# Patient Record
Sex: FEMALE | Race: WHITE | URBAN - METROPOLITAN AREA
[De-identification: names, ages, dates, MRNs, and addresses within clinical notes are randomized per-mention and may not be internally consistent; named-entity substitution may affect disease eponyms.]

---

## 2017-07-17 DIAGNOSIS — E28.2 PCOS (POLYCYSTIC OVARIAN SYNDROME): ICD-10-CM

## 2017-07-17 RX ORDER — METFORMIN HCL 500 MG
500 TABLET, EXTENDED RELEASE 24 HR ORAL
Qty: 30 TABLET | Refills: 0 | Status: SHIPPED | OUTPATIENT
Start: 2017-07-17 | End: 2017-08-16

## 2017-07-17 RX ORDER — SPIRONOLACTONE 100 MG/1
100 TABLET, FILM COATED ORAL 2 TIMES DAILY
Qty: 60 TABLET | Refills: 0 | Status: SHIPPED | OUTPATIENT
Start: 2017-07-17 | End: 2017-08-16

## 2017-07-17 NOTE — TELEPHONE ENCOUNTER
Received refill request for spirolactone and metformin for PCOS. Patient due for check up with Orlin. Spoke with patient to let her know a temporary refill would be sent but that she needs to see Orlin. Patient agrees and was transferred to scheduling.

## 2017-08-16 ENCOUNTER — TELEPHONE (OUTPATIENT)
Dept: OBGYN | Facility: CLINIC | Age: 22
End: 2017-08-16

## 2017-08-16 NOTE — TELEPHONE ENCOUNTER
----- Message from Lisette Ordaz MD sent at 8/16/2017  8:23 AM CDT -----  Regarding: she missed her appt today but i just renewed all meds for the year for her PCOS.  cricket prn

## 2017-08-16 NOTE — TELEPHONE ENCOUNTER
Pt missed appt today-Dr. Ordaz filled all of patients PCOS medications for the year. She should follow up as needed. Left VM for pt that prescriptions were sent.

## 2017-11-26 ENCOUNTER — HEALTH MAINTENANCE LETTER (OUTPATIENT)
Age: 22
End: 2017-11-26

## 2018-02-02 ENCOUNTER — OFFICE VISIT (OUTPATIENT)
Dept: OBGYN | Facility: CLINIC | Age: 23
End: 2018-02-02
Payer: COMMERCIAL

## 2018-02-02 VITALS
HEIGHT: 64 IN | SYSTOLIC BLOOD PRESSURE: 103 MMHG | DIASTOLIC BLOOD PRESSURE: 71 MMHG | HEART RATE: 74 BPM | WEIGHT: 141 LBS | BODY MASS INDEX: 24.07 KG/M2

## 2018-02-02 DIAGNOSIS — N93.9 VAGINAL BLEEDING: ICD-10-CM

## 2018-02-02 DIAGNOSIS — Z12.4 CERVICAL CANCER SCREENING: Primary | ICD-10-CM

## 2018-02-02 DIAGNOSIS — N90.89 VULVAR IRRITATION: ICD-10-CM

## 2018-02-02 DIAGNOSIS — Z11.3 ROUTINE SCREENING FOR STI (SEXUALLY TRANSMITTED INFECTION): ICD-10-CM

## 2018-02-02 DIAGNOSIS — N89.8 VAGINAL DISCHARGE: ICD-10-CM

## 2018-02-02 LAB
CLUE CELLS: NORMAL
TRICHOMONAS (WET PREP): NORMAL
YEAST (WET PREP): NORMAL

## 2018-02-02 PROCEDURE — G0145 SCR C/V CYTO,THINLAYER,RESCR: HCPCS | Performed by: STUDENT IN AN ORGANIZED HEALTH CARE EDUCATION/TRAINING PROGRAM

## 2018-02-02 PROCEDURE — 87491 CHLMYD TRACH DNA AMP PROBE: CPT | Performed by: STUDENT IN AN ORGANIZED HEALTH CARE EDUCATION/TRAINING PROGRAM

## 2018-02-02 PROCEDURE — 87210 SMEAR WET MOUNT SALINE/INK: CPT | Mod: ZF | Performed by: STUDENT IN AN ORGANIZED HEALTH CARE EDUCATION/TRAINING PROGRAM

## 2018-02-02 PROCEDURE — 87591 N.GONORRHOEAE DNA AMP PROB: CPT | Performed by: STUDENT IN AN ORGANIZED HEALTH CARE EDUCATION/TRAINING PROGRAM

## 2018-02-02 PROCEDURE — G0463 HOSPITAL OUTPT CLINIC VISIT: HCPCS | Mod: ZF

## 2018-02-02 RX ORDER — ESTRADIOL 0.5 MG/1
0.5 TABLET ORAL DAILY PRN
Qty: 30 TABLET | Refills: 3 | Status: SHIPPED | OUTPATIENT
Start: 2018-02-02 | End: 2018-06-01

## 2018-02-02 NOTE — PATIENT INSTRUCTIONS
Nice to meet you today!  Please keep your appointment with Dr. Ordaz in March.  You can take the oral estrogen daily when you have breakthrough spotting.  Use the vaginal estrogen cream daily for the 1st week, then twice per week for the second week, then weekly after. Use a pea size amount and apply to the vulva near the vaginal opening.   Please follow up in clinic as needed.

## 2018-02-02 NOTE — MR AVS SNAPSHOT
After Visit Summary   2/2/2018    Brenda Morrissey    MRN: [id number]           Patient Information     Date Of Birth          1995        Visit Information        Provider Department      2/2/2018 2:15 PM Tanesha Yarbrough MD [facility]        Today's Diagnoses     Cervical cancer screening    -  1    Routine screening for STI (sexually transmitted infection)        Vaginal discharge        Vaginal bleeding        Vulvar irritation          Care Instructions    Nice to meet you today!  Please keep your appointment with Dr. Ordaz in March.  You can take the oral estrogen daily when you have breakthrough spotting.  Use the vaginal estrogen cream daily for the 1st week, then twice per week for the second week, then weekly after. Use a pea size amount and apply to the vulva near the vaginal opening.   Please follow up in clinic as needed.           Follow-ups after your visit        Your next 10 appointments already scheduled     Mar 28, 2018 10:00 AM CDT   Annual Visit with Lisette Ordaz MD   [facility] ([facility])    [facility] [address]  [address]  [facility] [address] [phone]              Who to contact     Please call your clinic at [phone] to:    Ask questions about your health    Make or cancel appointments    Discuss your medicines    Learn about your test results    Speak to your doctor   If you have compliments or concerns about an experience at your clinic, or if you wish to file a complaint, please contact [facility] Patient Relations at [phone] or email us at [email or web address]         Additional Information About Your Visit        MyChart Information     [employer] gives you secure access to your electronic health record. If you see a primary care provider, you can also send messages to your care team and make appointments. If you  "have questions, please call your primary care clinic.  If you do not have a primary care provider, please call [phone] and they will assist you.      [employer] is an electronic gateway that provides easy, online access to your medical records. With [employer], you can request a clinic appointment, read your test results, renew a prescription or communicate with your care team.     To access your existing account, please contact your [facility] or call [phone] for assistance.        Care EveryWhere ID     This is your Care EveryWhere ID. This could be used by other organizations to access your [facility] medical records  [id number]        Your Vitals Were     Pulse Height Last Period Breastfeeding? BMI (Body Mass Index)       74 1.626 m (5' 4\") 01/14/2017 No 24.2 kg/m2        Blood Pressure from Last 3 Encounters:   02/02/18 103/71   01/11/16 103/66   07/31/15 105/69    Weight from Last 3 Encounters:   02/02/18 64 kg (141 lb)   02/12/16 55.9 kg (123 lb 4.8 oz) (41 %)*   01/11/16 57.6 kg (127 lb) (49 %)*     * Growth percentiles are based on CDC 2-20 Years data.              We Performed the Following     Chlamydia trachomatis PCR (Lab)     Neisseria gonorrhoeae PCR     Obtaining, preparing and conveyance of cervical or vaginal smear to laboratory.     Pap imaged thin layer screen only - recommended age 21 - 24 years     Wet Prep POCT          Today's Medication Changes          These changes are accurate as of 2/2/18  3:36 PM.  If you have any questions, ask your nurse or doctor.               Start taking these medicines.        Dose/Directions    conjugated estrogens cream   Commonly known as:  PREMARIN   Used for:  Vulvar irritation   Started by:  Tanesha Yarbrough MD        Dose:  0.5 g   Place 0.5 g vaginally daily   Quantity:  5 g   Refills:  1       estradiol 0.5 MG tablet   Commonly known as:  ESTRACE   Used for:  Vaginal bleeding   Started by:  Tanesha Yarbrough " MD Nilda        Dose:  0.5 mg   Take 1 tablet (0.5 mg) by mouth daily as needed   Quantity:  30 tablet   Refills:  3            Where to get your medicines      These medications were sent to [id number] - [place], [place] - [address]  [address] [address]     Phone:  [phone]     conjugated estrogens cream    estradiol 0.5 MG tablet                Primary Care Provider Office Phone # Fax #    Elma You Montes MD [facility] [phone]       [address] [place] [address]        Equal Access to Services     [facility]: Hadii son ku hadasho Soomaali, waaxda luqadaha, qaybta kaalmada adekendrickyaramona, bob adames . So [facility] [phone].    ATENCIÓN: Si habla español, tiene a villalta disposición servicios gratuitos de asistencia lingüística. [facility] [phone].    We comply with applicable federal civil rights laws and [place] laws. We do not discriminate on the basis of race, color, national origin, age, disability, sex, sexual orientation, or gender identity.            Thank you!     Thank you for choosing [facility]  for your care. Our goal is always to provide you with excellent care. Hearing back from our patients is one way we can continue to improve our services. Please take a few minutes to complete the written survey that you may receive in the mail after your visit with us. Thank you!             Your Updated Medication List - Protect others around you: Learn how to safely use, store and throw away your medicines at www.disposemymeds.org.          This list is accurate as of 2/2/18  3:36 PM.  Always use your most recent med list.                   Brand Name Dispense Instructions for use Diagnosis    clindamycin-benzoyl peroxide gel    BENZACLIN    30 g    Apply topically as needed Apply a thin layer on the face every morning    Acne       conjugated estrogens cream    PREMARIN    5 g     Place 0.5 g vaginally daily    Vulvar irritation       estradiol 0.5 MG tablet    ESTRACE    30 tablet    Take 1 tablet (0.5 mg) by mouth daily as needed    Vaginal bleeding       etonogestrel-ethinyl estradiol 0.12-0.015 MG/24HR vaginal ring    NUVARING    3 each    Place 1 ring every 28 days. Continuous use. No days off.    PCOS (polycystic ovarian syndrome)       metFORMIN 500 MG 24 hr tablet    GLUCOPHAGE-XR    90 tablet    Take 1 tablet (500 mg) by mouth daily (with dinner)    PCOS (polycystic ovarian syndrome)       MULTIVITAMINS PO      Take  by mouth daily.        spironolactone 100 MG tablet    ALDACTONE    90 tablet    Take 1 tablet (100 mg) by mouth 2 times daily    PCOS (polycystic ovarian syndrome)       TAZORAC EX           tretinoin microspheres 0.1 % topical gel    RETIN-A MICRO     Apply  topically At Bedtime. For acne, thinly apply at bedtime. Skip if irritated        WELLBUTRIN PO      Take 50 mg by mouth daily

## 2018-02-02 NOTE — PROGRESS NOTES
"[facility]  Follow-Up Visit    S: Ms. Brenda Morrissey is a 22 year old G0 who presents for to discuss intermenstrual spotting. Her medical history is pertinent for PCOS diagnosed in 2013 and she has been using nuvaring for contraception for the last 6 years. When she started using nuvaring she would use 3 rings continuously and then remove it to have a period. She would occasionally remove nuvaring monthly and have monthly periods. She did this because she \"felt like her body needed a break\". She started doing monthly cycles about 6 months ago. Her periods are usually very light and similar to spotting. She started having intermenstrual spotting about 6 months ago. The spotting will start about 1.5-2 weeks after she placed a new nuvaring and continue through when she would have her period. She bleeding would stop and then she would put a new nuvaring in.     Today she also reports vulvar symptoms of irrigation and thin white discharge. Denies itching or bad odor. She thinks it may be BV.     O:  /71  Pulse 74  Ht 1.626 m (5' 4\")  Wt 64 kg (141 lb)  LMP 01/14/2017  Breastfeeding? No  BMI 24.2 kg/m2  Gen: Well-appearing, NAD  HEENT: Normocephalic, atraumatic  CV:  Well perfused  Pulm: No increased work of breathing  Abd: Soft, non-tender, non-distended  Ext: No LE edema, extremities warm and well perfused    Pelvic:  Introitus and skin around it looks thin, slightly erythematous and atrophic. Discomfort when posterior digital pressure applied just inside introitus.  Otherwise normal appearing external female genitalia. Normal hair distribution that is shaved off. Vagina is without lesions. Scant white physiologic looking discharge. Cervix nuliiparous, ectropion seen, no lesions, no cervical motion tenderness. Uterus is small, mobile, non-tender. No adnexal tenderness or masses      Labs:  POC Wet prep: negative    A/P:  Ms. Brenda Morrissey is a 22 year old G0 here for vaginal spotting.    Vaginal " spotting:  - Intermenstrual spotting is most likely due to atrophy of the endometrial lining due to long term use of nuvaring. Suggested that we could switch her to OCPs and see if that helped but she declined because she had a bad experience with OCPs when she was younger. Discussed with patient that we can try adding back a little estrogen when she is having breakthrough bleeding. Prescribed 0.5 mg estradiol to be taken daily prn when having breakthrough bleeding.   - GC/Chlam collected to rule out cervicitis   - Also ordered pelvic US to rule out more sinister cause of vaginal spotting.     Vulvar irritation and atrophy:  - Most likely due to hypoestrogenic state. Patient was prescribed estrogen cream and instructed to apply daily for one weekly, then twice weekly for one week, then weekly after.   - Wet prep was negative.   - She has follow up with Dr. Ordaz in March, and will reassess at that appointment     Health maintenance:  - Pap smear collected today  - we will send her a Anonymous You message with the results     Patient has an annual exam with Dr. Ordaz on March 28 2018 and she will follow up in clinic as needed.     Patient seen and discussed with Dr. Ordaz.     Tanesha Yarbrough MD PhD  Ob/Gyn, PGY-2  2/2/2018, 2:28 PM    The Patient was seen in [facility] by TANESHA YARBROUGH.  I reviewed the history & exam. I have examined the patient with the resident.   Assessment and plan were jointly made.    Lisette Ordaz MD

## 2018-02-02 NOTE — NURSING NOTE
Chief Complaint   Patient presents with     RECHECK     Birth control consult   Evelyn Antonio LPN

## 2018-02-04 LAB
C TRACH DNA SPEC QL NAA+PROBE: NEGATIVE
N GONORRHOEA DNA SPEC QL NAA+PROBE: NEGATIVE
SPECIMEN SOURCE: NORMAL
SPECIMEN SOURCE: NORMAL

## 2018-02-06 ENCOUNTER — OFFICE VISIT (OUTPATIENT)
Dept: OBGYN | Facility: CLINIC | Age: 23
End: 2018-02-06
Attending: OBSTETRICS & GYNECOLOGY
Payer: COMMERCIAL

## 2018-02-06 DIAGNOSIS — N93.9 VAGINAL BLEEDING: ICD-10-CM

## 2018-02-06 LAB
COPATH REPORT: NORMAL
PAP: NORMAL

## 2018-02-06 PROCEDURE — 76830 TRANSVAGINAL US NON-OB: CPT | Mod: ZF

## 2018-02-06 NOTE — PROGRESS NOTES
22 year old female with LMP mid January, presents for gynecologic ultrasound indicated by intermenstrual bleeding.  This study was done transvaginally.    Uterine findings:   Presence: Visible Size: Normal 3.9x3.9x2.8 cm.  Endometrium = 6.6 mm.   Cx length = 34.2 mm.      Flexion:  Anteverted Position: Midline Margins: Smooth Shape: Normal   Contour: Regular Texture: Homogeneous Cavity: fluid in cavity Masses: Normal    Pelvic findings:    Right Adnexa: Normal   Left Adnexa: Normal   Bladder:  Normal         Cul - de - sac fluid: None    Ovarian follicles:   Right ovary:  2.6x1.5x1.2cm.     Small follicles     Size(s):  Less than 5mm     Left ovary:  2.9x1.4x1.3cm.     Small follicles     Size(s):  Less than 5mm      Comments:  Fluid in endometrial cavity, possibly blood. No intracavitary lesions, otherwise normal ultrasound.     SULY Mar MD

## 2018-02-07 ENCOUNTER — TELEPHONE (OUTPATIENT)
Dept: OBGYN | Facility: CLINIC | Age: 23
End: 2018-02-07

## 2018-02-07 DIAGNOSIS — Z00.00 ROUTINE GENERAL MEDICAL EXAMINATION AT A HEALTH CARE FACILITY: Primary | ICD-10-CM

## 2018-02-07 NOTE — TELEPHONE ENCOUNTER
----- Message from Lisette Ordaz MD sent at 2/7/2018  9:36 AM CST -----  Regarding: RE: Pt requesting labs  Ok to order estradiol level, fasting lipid profile, hgb A1C and fasting glu and insulin.    Thanks.   ----- Message -----     From: Aida Chávez RN     Sent: 2/6/2018  10:24 AM       To: Lisette Ordaz MD  Subject: Pt requesting labs                               Pt was in clinic last week for evaluation abnormal bleeding and vaginal atrophy, presented to clinic today for U/S. Inquired if could get labs checked (hormone labs in particular) prior to annual appt with you in March. I explained why labs are generally ordered at time of annual appt but she is concerned about current treatment of estradiol and estrogen cream w/o lab levels being checked. She's willing to come in for another clinic visit prior to annual if necessary to have these ordered.    Can you advise and route to triage? Thanks!    Maria R

## 2018-04-09 DIAGNOSIS — Z00.00 ROUTINE GENERAL MEDICAL EXAMINATION AT A HEALTH CARE FACILITY: ICD-10-CM

## 2018-04-09 LAB
CHOLEST SERPL-MCNC: 126 MG/DL
ESTRADIOL SERPL-MCNC: 23 PG/ML
GLUCOSE SERPL-MCNC: 76 MG/DL (ref 70–99)
HBA1C MFR BLD: 4.7 % (ref 0–6.4)
HDLC SERPL-MCNC: 89 MG/DL
INSULIN SERPL-ACNC: 6.5 MU/L (ref 3–25)
LDLC SERPL CALC-MCNC: 30 MG/DL
NONHDLC SERPL-MCNC: 37 MG/DL
TRIGL SERPL-MCNC: 37 MG/DL

## 2018-04-09 PROCEDURE — 82947 ASSAY GLUCOSE BLOOD QUANT: CPT | Performed by: INTERNAL MEDICINE

## 2018-04-09 PROCEDURE — 80061 LIPID PANEL: CPT | Performed by: INTERNAL MEDICINE

## 2018-04-09 PROCEDURE — 82670 ASSAY OF TOTAL ESTRADIOL: CPT | Performed by: INTERNAL MEDICINE

## 2018-04-09 PROCEDURE — 83036 HEMOGLOBIN GLYCOSYLATED A1C: CPT | Performed by: INTERNAL MEDICINE

## 2018-04-09 PROCEDURE — 83525 ASSAY OF INSULIN: CPT | Performed by: INTERNAL MEDICINE

## 2018-04-09 PROCEDURE — 36415 COLL VENOUS BLD VENIPUNCTURE: CPT | Performed by: INTERNAL MEDICINE

## 2018-06-01 ENCOUNTER — TELEPHONE (OUTPATIENT)
Dept: OBGYN | Facility: CLINIC | Age: 23
End: 2018-06-01

## 2018-06-01 DIAGNOSIS — N93.9 VAGINAL BLEEDING: ICD-10-CM

## 2018-06-01 RX ORDER — ESTRADIOL 0.5 MG/1
0.5 TABLET ORAL DAILY PRN
Qty: 30 TABLET | Refills: 0 | Status: SHIPPED | OUTPATIENT
Start: 2018-06-01 | End: 2018-06-20

## 2018-06-01 NOTE — TELEPHONE ENCOUNTER
Refill request for estradiol. Pt has appointment on June 20th. Will send 30 day supply to pharmacy to get through until this appt date.

## 2018-06-08 NOTE — TELEPHONE ENCOUNTER
FUTURE VISIT INFORMATION      FUTURE VISIT INFORMATION:    Date: 06/21/2018    Time: 9:00    Location: [facility]  REFERRAL INFORMATION:    Referring provider:  SELF    Referring providers clinic:  N/A    Reason for visit/diagnosis  LEFT EAR FULLNESS        RECORDS STATUS    PER INTAKE REP DANIELA FISHMAN SPOKE WITH PATIENT ON 05/04/2018 AT 2:12PM WE HAVE NO OUTSIDE RECORDS

## 2018-06-20 ENCOUNTER — OFFICE VISIT (OUTPATIENT)
Dept: OBGYN | Facility: CLINIC | Age: 23
End: 2018-06-20
Attending: OBSTETRICS & GYNECOLOGY
Payer: COMMERCIAL

## 2018-06-20 VITALS
BODY MASS INDEX: 23.25 KG/M2 | HEART RATE: 76 BPM | WEIGHT: 136.2 LBS | HEIGHT: 64 IN | SYSTOLIC BLOOD PRESSURE: 100 MMHG | DIASTOLIC BLOOD PRESSURE: 69 MMHG

## 2018-06-20 DIAGNOSIS — N93.9 VAGINAL BLEEDING: ICD-10-CM

## 2018-06-20 DIAGNOSIS — L70.0 ACNE VULGARIS: ICD-10-CM

## 2018-06-20 DIAGNOSIS — H93.8X9 EAR FULLNESS: Primary | ICD-10-CM

## 2018-06-20 DIAGNOSIS — E28.2 PCOS (POLYCYSTIC OVARIAN SYNDROME): ICD-10-CM

## 2018-06-20 RX ORDER — METFORMIN HCL 500 MG
500 TABLET, EXTENDED RELEASE 24 HR ORAL
Qty: 90 TABLET | Refills: 3 | Status: SHIPPED | OUTPATIENT
Start: 2018-06-20 | End: 2019-07-12

## 2018-06-20 RX ORDER — CLINDAMYCIN AND BENZOYL PEROXIDE 10; 50 MG/G; MG/G
GEL TOPICAL PRN
Qty: 30 G | Refills: 1 | Status: SHIPPED | OUTPATIENT
Start: 2018-06-20 | End: 2022-02-16

## 2018-06-20 RX ORDER — SPIRONOLACTONE 100 MG/1
100 TABLET, FILM COATED ORAL 2 TIMES DAILY
Qty: 180 TABLET | Refills: 3 | Status: SHIPPED | OUTPATIENT
Start: 2018-06-20 | End: 2019-11-20

## 2018-06-20 RX ORDER — ETONOGESTREL AND ETHINYL ESTRADIOL VAGINAL RING .015; .12 MG/D; MG/D
RING VAGINAL
Qty: 3 EACH | Refills: 3 | Status: SHIPPED | OUTPATIENT
Start: 2018-06-20 | End: 2019-11-20

## 2018-06-20 RX ORDER — TRETINOIN 1 MG/G
GEL TOPICAL AT BEDTIME
Qty: 30 G | Refills: 11 | Status: SHIPPED | OUTPATIENT
Start: 2018-06-20 | End: 2020-04-16

## 2018-06-20 RX ORDER — ESTRADIOL 0.5 MG/1
0.5 TABLET ORAL DAILY PRN
Qty: 30 TABLET | Refills: 3 | Status: SHIPPED | OUTPATIENT
Start: 2018-06-20 | End: 2018-12-21

## 2018-06-20 NOTE — LETTER
2018       RE: Brenda Morrissey  [address] [facility] [address]     Dear Colleague,    Thank you for referring your patient, Brenda Morrissey, to the [facility] at [facility]. Please see a copy of my visit note below.    21 yo P0 with PCOS currently managed on metformin, Nuvaring with estradiol for breakthrough bleeding, and spironolactone presents for followup. States has had more breakthrough bleeding over past 6 months using estradiol almost every other day.  Currently changing her ring every 30-40 days, no days off.   Total visit time was 35 minutes with 35 minutes spent in counseling and coordination of care for PCOS and breakthrough bleeding.  We discussed hormonal management of menses in setting of PCOS.   We discussed options, risks, and benefits.     Patient Active Problem List   Diagnosis     Menstrual irregularity     Hair loss     Acne     Breast atrophy     Joint pain     Thickened nails     Spider veins     Eating disorder     PCOS (polycystic ovarian syndrome)     Past Medical History:   Diagnosis Date     Acne      Eating disorder, unspecified     Seen at the [facility]     PCOS (polycystic ovarian syndrome)      Past Surgical History:   Procedure Laterality Date     TONSILLECTOMY       Obstetric History       T0      L0     SAB0   TAB0   Ectopic0   Multiple0   Live Births0         Social History     Social History     Marital status: Single     Spouse name: N/A     Number of children: N/A     Years of education: N/A     Occupational History     student      Social History Main Topics     Smoking status: Never Smoker     Smokeless tobacco: Never Used     Alcohol use No     Drug use: No     Sexual activity: Yes     Partners: Male     Birth control/ protection: Inserts/Ring     Other Topics Concern     None     Social History Narrative    7/31/15    [employer] in the Fall.     [employer], pre-nursing.  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 \"on again off again\" boyfriend.     Lisette Brigida Ordaz            Lives with mom. Starting 12th grade (2013-14 school year). Has had difficulty in school due to fatigue.     /69  Pulse 76  Ht 1.626 m (5' 4\")  Wt 61.8 kg (136 lb 3.2 oz)  LMP 05/18/2018 (Exact Date)  BMI 23.38 kg/m2  Appears well  Skin: acne and hirsutism well controlled    A: PCOS complicated by breakthrough bleeding on Nuvaring  P: try changing Nuvaring every 21 days, continuous fashion, no days off. Still use estradiol prn breakthrough bleeding.   If no improvement we will consider patch or OCP  Refills sent    RTC 3 months or prn.   Lisette Ordaz      "

## 2018-06-20 NOTE — PROGRESS NOTES
"21 yo P0 with PCOS currently managed on metformin, Nuvaring with estradiol for breakthrough bleeding, and spironolactone presents for followup. States has had more breakthrough bleeding over past 6 months using estradiol almost every other day.  Currently changing her ring every 30-40 days, no days off.   Total visit time was 35 minutes with 35 minutes spent in counseling and coordination of care for PCOS and breakthrough bleeding.  We discussed hormonal management of menses in setting of PCOS.   We discussed options, risks, and benefits.     Patient Active Problem List   Diagnosis     Menstrual irregularity     Hair loss     Acne     Breast atrophy     Joint pain     Thickened nails     Spider veins     Eating disorder     PCOS (polycystic ovarian syndrome)     Past Medical History:   Diagnosis Date     Acne      Eating disorder, unspecified     Seen at the [facility]     PCOS (polycystic ovarian syndrome)      Past Surgical History:   Procedure Laterality Date     TONSILLECTOMY       Obstetric History       T0      L0     SAB0   TAB0   Ectopic0   Multiple0   Live Births0         Social History     Social History     Marital status: Single     Spouse name: N/A     Number of children: N/A     Years of education: N/A     Occupational History     student      Social History Main Topics     Smoking status: Never Smoker     Smokeless tobacco: Never Used     Alcohol use No     Drug use: No     Sexual activity: Yes     Partners: Male     Birth control/ protection: Inserts/Ring     Other Topics Concern     None     Social History Narrative    7/31/15    [employer] in the Fall.     [employer], pre-nursing.     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 \"on again off again\" boyfriend.     Lisette Brigida Ordaz            Lives with mom. Starting 12th grade ( school year). Has had difficulty in school due to fatigue.     /69  Pulse 76  Ht 1.626 " "m (5' 4\")  Wt 61.8 kg (136 lb 3.2 oz)  LMP 05/18/2018 (Exact Date)  BMI 23.38 kg/m2  Appears well  Skin: acne and hirsutism well controlled    A: PCOS complicated by breakthrough bleeding on Nuvaring  P: try changing Nuvaring every 21 days, continuous fashion, no days off. Still use estradiol prn breakthrough bleeding.   If no improvement we will consider patch or OCP  Refills sent    RTC 3 months or prn.   Lisette Ordaz      "

## 2018-06-20 NOTE — MR AVS SNAPSHOT
After Visit Summary   6/20/2018    Brenda Morrissey    MRN: [id number]           Patient Information     Date Of Birth          1995        Visit Information        Provider Department      6/20/2018 7:45 AM Lisette Ordaz MD [facility]        Today's Diagnoses     PCOS (polycystic ovarian syndrome)        Acne vulgaris        Vaginal bleeding           Follow-ups after your visit        Your next 10 appointments already scheduled     Jun 21, 2018  8:00 AM CDT   Walk In From ENT with Antonio Ackerman   [facility] ([facility])    [address] [place] [id number] [phone]            Jun 21, 2018  9:00 AM CDT   (Arrive by 8:45 AM)   New Patient Visit with Hollis Vogt MD   [facility] ([facility])    [address] [place] [id number] [phone]              Future tests that were ordered for you today     Open Future Orders        Priority Expected Expires Ordered    Dexa hip/pelvis/spine Routine  6/20/2019 6/20/2018            Who to contact     Please call your clinic at [phone] to:    Ask questions about your health    Make or cancel appointments    Discuss your medicines    Learn about your test results    Speak to your doctor            Additional Information About Your Visit        [employer] Information     [employer] gives you secure access to your electronic health record. If you see a primary care provider, you can also send messages to your care team and make appointments. If you have questions, please call your primary care clinic.  If you do not have a primary care provider, please call [phone] and they will assist you.      K[employer]aro is an electronic gateway that provides easy, online access to your medical records. With [employer], you can request a clinic appointment, read your test results, renew a prescription  "or communicate with your care team.     To access your existing account, please contact your [facility] or call [phone] for assistance.        Care EveryWhere ID     This is your Care EveryWhere ID. This could be used by other organizations to access your [place] medical records  [id number]        Your Vitals Were     Pulse Height Last Period BMI (Body Mass Index)          76 1.626 m (5' 4\") 05/18/2018 (Exact Date) 23.38 kg/m2         Blood Pressure from Last 3 Encounters:   06/20/18 100/69   02/02/18 103/71   01/11/16 103/66    Weight from Last 3 Encounters:   06/20/18 61.8 kg (136 lb 3.2 oz)   02/02/18 64 kg (141 lb)   02/12/16 55.9 kg (123 lb 4.8 oz) (41 %)*     * Growth percentiles are based on [facility] 2-20 Years data.                 Today's Medication Changes          These changes are accurate as of 6/20/18  8:10 AM.  If you have any questions, ask your nurse or doctor.               These medicines have changed or have updated prescriptions.        Dose/Directions    estradiol 0.5 MG tablet   Commonly known as:  ESTRACE   This may have changed:  additional instructions   Used for:  Vaginal bleeding   Changed by:  Lisette Ordaz MD        Dose:  0.5 mg   Take 1 tablet (0.5 mg) by mouth daily as needed Take one po daily for breakthrough bleeding prn.   Quantity:  30 tablet   Refills:  3       etonogestrel-ethinyl estradiol 0.12-0.015 MG/24HR vaginal ring   Commonly known as:  NUVARING   This may have changed:  additional instructions   Used for:  PCOS (polycystic ovarian syndrome)   Changed by:  Lisette Oradz MD        Place 1 ring every 21 days. Continuous use. No days off.   Quantity:  3 each   Refills:  3         Stop taking these medicines if you haven't already. Please contact your care team if you have questions.     WELLBUTRIN PO   Stopped by:  Lisette Ordaz MD                Where to get your medicines      These medications were sent to [facility] [id number] " IN [facility] - [place], [place] - [address]  [address], [facility] [address]     Phone:  [phone]     clindamycin-benzoyl peroxide gel    estradiol 0.5 MG tablet    etonogestrel-ethinyl estradiol 0.12-0.015 MG/24HR vaginal ring    metFORMIN 500 MG 24 hr tablet    spironolactone 100 MG tablet    tretinoin microspheres 0.1 % topical gel                Primary Care Provider Office Phone # Fax #    Elma You Montes MD PhD [phone]       [address] [place] [address]        Equal Access to Services     ER[facility]A [facility] Hospital[facility] : Hadii son cabrera haddono Soernesto, waaxda lusantosh, qaybta kaalmada jesus, bob adames . So [facility] [phone].    ATENCIÓN: Si habla español, tiene a villalta disposición servicios gratuitos de asistencia lingüística. LlSelect [facility] [phone].    We comply with applicable federal civil rights laws and [place] laws. We do not discriminate on the basis of race, color, national origin, age, disability, sex, sexual orientation, or gender identity.            Thank you!     Thank you for choosing [facility]  for your care. Our goal is always to provide you with excellent care. Hearing back from our patients is one way we can continue to improve our services. Please take a few minutes to complete the written survey that you may receive in the mail after your visit with us. Thank you!             Your Updated Medication List - Protect others around you: Learn how to safely use, store and throw away your medicines at www.disposemymeds.org.          This list is accurate as of 6/20/18  8:10 AM.  Always use your most recent med list.                   Brand Name Dispense Instructions for use Diagnosis    clindamycin-benzoyl peroxide gel    BENZACLIN    30 g    Apply topically as needed Apply a thin layer on the face every morning    Acne vulgaris       conjugated estrogens cream    PREMARIN    5 g    Place 0.5 g  vaginally daily    Vulvar irritation       estradiol 0.5 MG tablet    ESTRACE    30 tablet    Take 1 tablet (0.5 mg) by mouth daily as needed Take one po daily for breakthrough bleeding prn.    Vaginal bleeding       etonogestrel-ethinyl estradiol 0.12-0.015 MG/24HR vaginal ring    NUVARING    3 each    Place 1 ring every 21 days. Continuous use. No days off.    PCOS (polycystic ovarian syndrome)       metFORMIN 500 MG 24 hr tablet    GLUCOPHAGE-XR    90 tablet    Take 1 tablet (500 mg) by mouth daily (with dinner)    PCOS (polycystic ovarian syndrome)       MULTIVITAMINS PO      Take  by mouth daily.        spironolactone 100 MG tablet    ALDACTONE    180 tablet    Take 1 tablet (100 mg) by mouth 2 times daily    PCOS (polycystic ovarian syndrome)       TAZORAC EX           tretinoin microspheres 0.1 % topical gel    RETIN-A MICRO    30 g    Apply topically At Bedtime For acne, thinly apply at bedtime. Skip if irritated    Acne vulgaris

## 2018-06-21 ENCOUNTER — PRE VISIT (OUTPATIENT)
Dept: OTOLARYNGOLOGY | Facility: CLINIC | Age: 23
End: 2018-06-21

## 2018-12-21 ENCOUNTER — TELEPHONE (OUTPATIENT)
Dept: OBGYN | Facility: CLINIC | Age: 23
End: 2018-12-21

## 2018-12-21 DIAGNOSIS — N93.9 VAGINAL BLEEDING: ICD-10-CM

## 2018-12-21 RX ORDER — ESTRADIOL 0.5 MG/1
0.5 TABLET ORAL DAILY PRN
Qty: 30 TABLET | Refills: 0 | Status: SHIPPED | OUTPATIENT
Start: 2018-12-21 | End: 2018-12-27

## 2018-12-21 NOTE — TELEPHONE ENCOUNTER
Received refill request for Estradiol.  Last in clinic June 2018.      Per June 2018 visit note, patient to be seen for followup in 3 months or as needed. No scheduled visit at this time.     Tried to reach Allegra but received voicemail.  Left message that refill request was received and a one month supply can be sent to pharmacy but office visit is due for further refills. Please call [phone] to schedule.

## 2018-12-27 DIAGNOSIS — N93.9 VAGINAL BLEEDING: ICD-10-CM

## 2018-12-27 RX ORDER — ESTRADIOL 0.5 MG/1
0.5 TABLET ORAL DAILY PRN
Qty: 30 TABLET | Refills: 0 | Status: SHIPPED | OUTPATIENT
Start: 2018-12-27 | End: 2019-02-28

## 2018-12-27 NOTE — TELEPHONE ENCOUNTER
Pt requesting 30 day supply of estradiol be sent to new pharmacy as she is on vacation.  S [facility]. Pt is aware she is due in clinic for follow up for further refills.      calling pt to schedule.

## 2019-01-24 ENCOUNTER — OFFICE VISIT (OUTPATIENT)
Dept: OBGYN | Facility: CLINIC | Age: 24
End: 2019-01-24
Attending: OBSTETRICS & GYNECOLOGY
Payer: COMMERCIAL

## 2019-01-24 VITALS
BODY MASS INDEX: 22.91 KG/M2 | HEIGHT: 64 IN | HEART RATE: 93 BPM | DIASTOLIC BLOOD PRESSURE: 74 MMHG | SYSTOLIC BLOOD PRESSURE: 106 MMHG | WEIGHT: 134.2 LBS

## 2019-01-24 DIAGNOSIS — E28.2 PCOS (POLYCYSTIC OVARIAN SYNDROME): Primary | ICD-10-CM

## 2019-01-24 LAB
FERRITIN SERPL-MCNC: 22 NG/ML (ref 12–150)
HGB BLD-MCNC: 14 G/DL (ref 11.7–15.7)
POTASSIUM SERPL-SCNC: 4 MMOL/L (ref 3.4–5.3)

## 2019-01-24 PROCEDURE — G0463 HOSPITAL OUTPT CLINIC VISIT: HCPCS | Mod: ZF

## 2019-01-24 PROCEDURE — 85018 HEMOGLOBIN: CPT | Performed by: OBSTETRICS & GYNECOLOGY

## 2019-01-24 PROCEDURE — 82728 ASSAY OF FERRITIN: CPT | Performed by: OBSTETRICS & GYNECOLOGY

## 2019-01-24 PROCEDURE — 36415 COLL VENOUS BLD VENIPUNCTURE: CPT | Performed by: OBSTETRICS & GYNECOLOGY

## 2019-01-24 PROCEDURE — 84132 ASSAY OF SERUM POTASSIUM: CPT | Performed by: OBSTETRICS & GYNECOLOGY

## 2019-01-24 RX ORDER — DESOGESTREL AND ETHINYL ESTRADIOL 0.15-0.03
1 KIT ORAL DAILY
Qty: 84 TABLET | Refills: 4 | Status: SHIPPED | OUTPATIENT
Start: 2019-01-24 | End: 2019-11-20

## 2019-01-24 ASSESSMENT — MIFFLIN-ST. JEOR: SCORE: 1353.73

## 2019-01-24 NOTE — LETTER
"1/24/2019     RE: Brenda Morrissey  [address] [facility] [address]     Dear Colleague,    Thank you for referring your patient, Brenda Morrissey, to the [facility] at [facility]. Please see a copy of my visit note below.    [facility] Visit    CC: Follow up PCOS    S: 23 year old P0 here for follow up of PCOS. Is currently on Nuva Ring, metformin, spinolactone with overall good results. Had breakthrough bleeding and uses daily estrace, acne has resolved. Does have thinning of hair and decreased libido since last visit. Also notes increase in vaginal irritation and discharge. Had negative wet prep and cultures recently. Feels her symptoms are similar to menopausal symptoms noted by her mom. Wonders if her estrogen levels are low. Was previously given rx for premarin cream for mild vaginal atrophy. Denies pain with intercourse, but has same partner and feels libido is much lower. Did recently have spironolactone dose increased.     O: /74 (BP Location: Right arm, Patient Position: Chair)   Pulse 93   Ht 1.626 m (5' 4\")   Wt 60.9 kg (134 lb 3.2 oz)   BMI 23.04 kg/m     General: No distress  Abdomen: Soft, non-tender, non-distended, no masses  Pelvic Exam:  Vulva: No external lesions, normal hair distribution, normal architecture  Vagina: Moist, pink, no abnormal discharge, mild atrophy with loss of rugae, no lesions  Cervix: smooth, pink, no visible lesions  Uterus: Normal size, anteverted, non-tender, mobile  Adnexa: Non-tender, no masses    A:23 year old with PCOS, some hormonal symptoms likely related to meds    P:Discussed different options for symptoms including- decreasing spironolactone to previous dose as libido could be from lower androgens, starting to use premarin cream for vaginal symptoms, or trial of OCP for vaginal symptoms- though will not likely help with libido. At this point, she wants to try OCP to " see how symptom are managed. Will try without estrace, but can add back if needed. Will mychart with questions.     K+, iron studies ordered today (hair loss)    Total time spent was 20 minutes, over 50% spent in counseling.    Rosa Isela Price MD FACOG

## 2019-01-28 NOTE — PROGRESS NOTES
"[facility] Visit    CC: Follow up PCOS    S: 23 year old P0 here for follow up of PCOS. Is currently on Nuva Ring, metformin, spinolactone with overall good results. Had breakthrough bleeding and uses daily estrace, acne has resolved. Does have thinning of hair and decreased libido since last visit. Also notes increase in vaginal irritation and discharge. Had negative wet prep and cultures recently. Feels her symptoms are similar to menopausal symptoms noted by her mom. Wonders if her estrogen levels are low. Was previously given rx for premarin cream for mild vaginal atrophy. Denies pain with intercourse, but has same partner and feels libido is much lower. Did recently have spironolactone dose increased.     O: /74 (BP Location: Right arm, Patient Position: Chair)   Pulse 93   Ht 1.626 m (5' 4\")   Wt 60.9 kg (134 lb 3.2 oz)   BMI 23.04 kg/m    General: No distress  Abdomen: Soft, non-tender, non-distended, no masses  Pelvic Exam:  Vulva: No external lesions, normal hair distribution, normal architecture  Vagina: Moist, pink, no abnormal discharge, mild atrophy with loss of rugae, no lesions  Cervix: smooth, pink, no visible lesions  Uterus: Normal size, anteverted, non-tender, mobile  Adnexa: Non-tender, no masses    A:23 year old with PCOS, some hormonal symptoms likely related to meds    P:Discussed different options for symptoms including- decreasing spironolactone to previous dose as libido could be from lower androgens, starting to use premarin cream for vaginal symptoms, or trial of OCP for vaginal symptoms- though will not likely help with libido. At this point, she wants to try OCP to see how symptom are managed. Will try without estrace, but can add back if needed. Will mychart with questions.     K+, iron studies ordered today (hair loss)    Total time spent was 20 minutes, over 50% spent in counseling.    Rosa Isela Price MD FACOG  "

## 2019-02-28 DIAGNOSIS — N93.9 VAGINAL BLEEDING: ICD-10-CM

## 2019-02-28 RX ORDER — ESTRADIOL 0.5 MG/1
0.5 TABLET ORAL DAILY PRN
Qty: 30 TABLET | Refills: 0 | Status: SHIPPED | OUTPATIENT
Start: 2019-02-28 | End: 2019-11-20

## 2019-02-28 NOTE — TELEPHONE ENCOUNTER
Received refill request for estradiol tablets which patient uses for breakthrough bleeding. Patient recently seen by Dr. Price and this was discussed. Rx sent.

## 2019-07-12 DIAGNOSIS — E28.2 PCOS (POLYCYSTIC OVARIAN SYNDROME): ICD-10-CM

## 2019-07-12 RX ORDER — METFORMIN HCL 500 MG
500 TABLET, EXTENDED RELEASE 24 HR ORAL
Qty: 90 TABLET | Refills: 3 | Status: SHIPPED | OUTPATIENT
Start: 2019-07-12 | End: 2019-11-20

## 2019-07-12 NOTE — TELEPHONE ENCOUNTER
Received refill request for metformin. Patient saw Dr. Price 1/2019 and plan was to continue treatment. Rx sent.

## 2019-10-10 ENCOUNTER — TELEPHONE (OUTPATIENT)
Dept: OBGYN | Facility: CLINIC | Age: 24
End: 2019-10-10

## 2019-10-10 NOTE — TELEPHONE ENCOUNTER
Left vm for Allegra that she should have plenty of metformin refills available, she should call her pharmacy.

## 2019-10-10 NOTE — TELEPHONE ENCOUNTER
----- Message from Ann Duran sent at 10/9/2019  3:10 PM CDT -----  Regarding: FW: Medication refill    ----- Message -----  From: Heather Stenr  Sent: 10/9/2019   1:48 PM CDT  [facility]  Subject: Medication refill                                Pt is needing Dr.Carrie Ordaz to refill her medication of Metformin 500mg, pt has an appt with Dr. Lisette Ordaz on 11/20/1p. Pts Pharmacy is [facility] in [place] on [address]. Please call the pt back, pts phone number is [phone], thank you

## 2019-11-20 ENCOUNTER — OFFICE VISIT (OUTPATIENT)
Dept: OBGYN | Facility: CLINIC | Age: 24
End: 2019-11-20
Attending: OBSTETRICS & GYNECOLOGY
Payer: COMMERCIAL

## 2019-11-20 VITALS — WEIGHT: 140.9 LBS | HEIGHT: 64 IN | BODY MASS INDEX: 24.05 KG/M2

## 2019-11-20 DIAGNOSIS — E28.2 PCOS (POLYCYSTIC OVARIAN SYNDROME): Primary | ICD-10-CM

## 2019-11-20 RX ORDER — DROSPIRENONE AND ETHINYL ESTRADIOL 0.03MG-3MG
1 KIT ORAL DAILY
Qty: 90 TABLET | Refills: 3 | Status: SHIPPED | OUTPATIENT
Start: 2019-11-20 | End: 2020-07-31

## 2019-11-20 RX ORDER — SPIRONOLACTONE 100 MG/1
100 TABLET, FILM COATED ORAL 2 TIMES DAILY
Qty: 180 TABLET | Refills: 3 | Status: SHIPPED | OUTPATIENT
Start: 2019-11-20 | End: 2020-12-02

## 2019-11-20 RX ORDER — METFORMIN HCL 500 MG
1000 TABLET, EXTENDED RELEASE 24 HR ORAL
Qty: 90 TABLET | Refills: 3 | Status: SHIPPED | OUTPATIENT
Start: 2019-11-20 | End: 2020-06-19

## 2019-11-20 ASSESSMENT — ANXIETY QUESTIONNAIRES
3. WORRYING TOO MUCH ABOUT DIFFERENT THINGS: MORE THAN HALF THE DAYS
2. NOT BEING ABLE TO STOP OR CONTROL WORRYING: MORE THAN HALF THE DAYS
7. FEELING AFRAID AS IF SOMETHING AWFUL MIGHT HAPPEN: NOT AT ALL
5. BEING SO RESTLESS THAT IT IS HARD TO SIT STILL: SEVERAL DAYS
6. BECOMING EASILY ANNOYED OR IRRITABLE: MORE THAN HALF THE DAYS
GAD7 TOTAL SCORE: 9
1. FEELING NERVOUS, ANXIOUS, OR ON EDGE: SEVERAL DAYS

## 2019-11-20 ASSESSMENT — PATIENT HEALTH QUESTIONNAIRE - PHQ9
5. POOR APPETITE OR OVEREATING: SEVERAL DAYS
SUM OF ALL RESPONSES TO PHQ QUESTIONS 1-9: 6

## 2019-11-20 ASSESSMENT — MIFFLIN-ST. JEOR: SCORE: 1379.12

## 2019-11-20 NOTE — LETTER
"2019       RE: Brenda Morrissey  [address] [facility] [address]     Dear Colleague,    Thank you for referring your patient, Brenda Morrissey, to the [facility] at [facility]. Please see a copy of my visit note below.    23 yo P0 with PCOS on OCPs presents for followup. States the breakthrough bleeding she experienced with the Nuva Ring is resolved on the OCP. Most recent HgbA1c 4.7 at [place] 2018. No symptoms of hirsutism, acne.  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     HCM: pap normal 2018  Chol 126 2018    Requests refills    Patient Active Problem List   Diagnosis     Menstrual irregularity     Hair loss     Acne     Breast atrophy     Joint pain     Thickened nails     Spider veins     Eating disorder     PCOS (polycystic ovarian syndrome)     Major depressive disorder, single episode, severe (H)     Past Medical History:   Diagnosis Date     Acne      Eating disorder, unspecified     Seen at the [facility]     PCOS (polycystic ovarian syndrome)      Past Surgical History:   Procedure Laterality Date     TONSILLECTOMY       OB History    Para Term  AB Living   0 0 0 0 0 0   SAB TAB Ectopic Multiple Live Births   0 0 0 0 0     Ht 1.626 m (5' 4\")   Wt 63.9 kg (140 lb 14.4 oz)   BMI 24.19 kg/m     Exam:  Constitutional: healthy, alert and no distress  Musculoskeletal: extremities normal- no gross deformities noted, gait normal and normal muscle tone  Skin: no suspicious lesions or rashes and no acne or hirstuism. No acanthosis nigricans.   Neurologic: Gait normal. Reflexes normal and symmetric. Sensation grossly WNL.  Psychiatric: mentation appears normal and affect normal/bright  Hematologic/Lymphatic/Immunologic: Normal cervical lymph nodes    A/P:  PCOS well controlled with metformin, OCPs, and spironolactone except for alpecia.   Recommend to increase metformin, " continue minoxidil, consider increasing sprionolactone.     Lisette Ordaz MD  Total visit time was 20 minutes with 20 minutes spent in counseling and coordination of care for PCOS.

## 2019-11-21 ASSESSMENT — ANXIETY QUESTIONNAIRES: GAD7 TOTAL SCORE: 9

## 2020-02-20 ENCOUNTER — TELEPHONE (OUTPATIENT)
Dept: OBGYN | Facility: CLINIC | Age: 25
End: 2020-02-20

## 2020-02-20 DIAGNOSIS — B37.31 YEAST INFECTION OF THE VAGINA: Primary | ICD-10-CM

## 2020-02-20 RX ORDER — FLUCONAZOLE 150 MG/1
150 TABLET ORAL ONCE
Qty: 1 TABLET | Refills: 1 | Status: SHIPPED | OUTPATIENT
Start: 2020-02-20 | End: 2020-02-20

## 2020-02-20 NOTE — TELEPHONE ENCOUNTER
Spoke with [place] who is having the following symptoms of yeast: lumphy white discharge, irritation, slight itching.  She took OTC Monitstat and got some relief but symptoms are still there.    Advised diflucan can be sent per protocol. Take 1 pill now and if after 3 days symptoms are still present, take second dose.  If 3 days after that symptoms still present, come to clinic for evaluation.    She indicated understanding and agreed with plan.

## 2020-03-02 ENCOUNTER — HEALTH MAINTENANCE LETTER (OUTPATIENT)
Age: 25
End: 2020-03-02

## 2020-04-15 ENCOUNTER — TELEPHONE (OUTPATIENT)
Dept: OBGYN | Facility: CLINIC | Age: 25
End: 2020-04-15

## 2020-04-15 NOTE — TELEPHONE ENCOUNTER
Pt thought she had a yeast infection and was treated with diflucan in February and took one dose and symptoms resolved     Travelled and returned one week later repeated Diflucan as symptoms returned after her trip.    She continues to struggle with vaginal discomfort, has  white to yellow discharge that she states is in large amounts and is needing to change a light panti liner  twice daily.  No odor to discharge.  She continues to struggle with burning and itching vaginally.     No pelvic pain and states she is afebrile    LMP:  Late February, taking continuous ocp x 3 months and no break. States she has missed OCP in the last month on occasion, and has not checked HPT.  Advised to check a home pregnancy test.      Denies urinary urgency, frequency or dysuria.    Plan:  Appt in next day or two to evaluate symptoms.  Check home pregnancy test.  Pt is in agreement with plan.  She understands to call if sx worsen, develops fever, or abdominal pain, symptoms of urinary tract infection or other urgent concerns.  Voices agreement to plan.  Transferred now to schedule appointment.

## 2020-04-16 ENCOUNTER — OFFICE VISIT (OUTPATIENT)
Dept: OBGYN | Facility: CLINIC | Age: 25
End: 2020-04-16
Attending: OBSTETRICS & GYNECOLOGY
Payer: COMMERCIAL

## 2020-04-16 VITALS
WEIGHT: 137 LBS | DIASTOLIC BLOOD PRESSURE: 79 MMHG | SYSTOLIC BLOOD PRESSURE: 117 MMHG | HEART RATE: 116 BPM | BODY MASS INDEX: 23.52 KG/M2

## 2020-04-16 DIAGNOSIS — B37.31 YEAST INFECTION OF THE VAGINA: Primary | ICD-10-CM

## 2020-04-16 PROCEDURE — G0463 HOSPITAL OUTPT CLINIC VISIT: HCPCS | Mod: ZF

## 2020-04-16 PROCEDURE — 87491 CHLMYD TRACH DNA AMP PROBE: CPT | Performed by: OBSTETRICS & GYNECOLOGY

## 2020-04-16 PROCEDURE — 87591 N.GONORRHOEAE DNA AMP PROB: CPT | Performed by: OBSTETRICS & GYNECOLOGY

## 2020-04-16 RX ORDER — TERCONAZOLE 0.4 %
1 CREAM WITH APPLICATOR VAGINAL AT BEDTIME
Qty: 45 G | Refills: 1 | Status: SHIPPED | OUTPATIENT
Start: 2020-04-16 | End: 2020-05-29

## 2020-04-16 ASSESSMENT — PAIN SCALES - GENERAL: PAINLEVEL: NO PAIN (0)

## 2020-04-16 NOTE — LETTER
April 20, 2020      Brenda Morrissey  [address] [place] [address]        Dear ,    We are writing to inform you of your test results.    Your test results fall within the expected range(s) or remain unchanged from previous results.  Please continue with current treatment plan.    Resulted Orders   Gonorrhea PCR   Result Value Ref Range    Specimen Descrip Cervix     N Gonorrhea PCR Negative NEG^Negative      Comment:      Negative for N. gonorrhoeae rRNA by transcription mediated amplification.  A negative result by transcription mediated amplification does not preclude   the presence of N. gonorrhoeae infection because results are dependent on   proper and adequate collection, absence of inhibitors, and sufficient rRNA to   be detected.     Chlamydia PCR (Clinic Collect)   Result Value Ref Range    Specimen Description Cervix     Chlamydia Trachomatis PCR Negative NEG^Negative      Comment:      Negative for C. trachomatis rRNA by transcription mediated amplification.  A negative result by transcription mediated amplification does not preclude   the presence of C. trachomatis infection because results are dependent on   proper and adequate collection, absence of inhibitors, and sufficient rRNA to   be detected.         If you have any questions or concerns, please call the clinic at the number listed above.       Sincerely,        Robyn Castano MD

## 2020-04-16 NOTE — PROGRESS NOTES
SUBJECTIVE:   24 year old  female complains of white vaginal discharge on and off since Feb.  Had some improvement with Diflucan, but not great.  Wonders if she can have STI testing.    Denies abnormal vaginal bleeding or significant pelvic pain or  fever. No UTI symptoms. Denies history of known exposure to STD. Denies dyspareunia- but has been abstaining d/t surface discomfort.    Wonders if related to change in BC.     Patient's last menstrual period was 03/31/2020.    OBJECTIVE:   She appears well, afebrile.  Abdomen: benign, soft, nontender, no masses.  Pelvic Exam: normal vagina and vulva, vaginal discharge described as white, curd-like and yellow, normal cervix without lesions or tenderness, wet mount exam shows extensive budding yeast    ASSESSMENT:   Yeast infection of the vagina    PLAN:   Treatment: Terazol cream x 7 days with refill.   Return to clinic if symptoms do not resolve or worsen.    Robyn Castano MD, FACOG  [facility]  OB/GYN    4/16/2020  12:46 PM

## 2020-04-16 NOTE — NURSING NOTE
Chief Complaint   Patient presents with     RECHECK     C/O vaginal infection   Evelyn Antonio LPN

## 2020-04-16 NOTE — LETTER
4/16/2020       RE: Brenda Morrissey  [address] [facility] [address]     Dear Colleague,    Thank you for referring your patient, Brenda Morrissey, to the [facility] at [facility]. Please see a copy of my visit note below.    SUBJECTIVE:   24 year old  female complains of white vaginal discharge on and off since Feb.  Had some improvement with Diflucan, but not great.  Wonders if she can have STI testing.    Denies abnormal vaginal bleeding or significant pelvic pain or  fever. No UTI symptoms. Denies history of known exposure to STD. Denies dyspareunia- but has been abstaining d/t surface discomfort.    Wonders if related to change in BC.     Patient's last menstrual period was 03/31/2020.    OBJECTIVE:   She appears well, afebrile.  Abdomen: benign, soft, nontender, no masses.  Pelvic Exam: normal vagina and vulva, vaginal discharge described as white, curd-like and yellow, normal cervix without lesions or tenderness, wet mount exam shows extensive budding yeast    ASSESSMENT:   Yeast infection of the vagina    PLAN:   Treatment: Terazol cream x 7 days with refill.   Return to clinic if symptoms do not resolve or worsen.    Robyn Castano MD, FACOG  [facility]  OB/GYN    4/16/2020  12:46 PM          Again, thank you for allowing me to participate in the care of your patient.      Sincerely,    Robyn Castano MD

## 2020-05-28 ENCOUNTER — TELEPHONE (OUTPATIENT)
Dept: OBGYN | Facility: CLINIC | Age: 25
End: 2020-05-28

## 2020-05-28 DIAGNOSIS — B37.31 YEAST INFECTION OF THE VAGINA: ICD-10-CM

## 2020-05-28 NOTE — TELEPHONE ENCOUNTER
Patient called regarding chronic yeast infection symptoms. Wants to know if she can be prescribed treatment without a visit.    Returned call and voicemail left.

## 2020-05-29 RX ORDER — TERCONAZOLE 0.4 %
1 CREAM WITH APPLICATOR VAGINAL AT BEDTIME
Qty: 45 G | Refills: 1 | Status: SHIPPED | OUTPATIENT
Start: 2020-05-29

## 2020-05-29 NOTE — TELEPHONE ENCOUNTER
Spoke with Allegra who reports she has yeast symptoms again of irritation, slight burning, white/yellow chunky discharge, slight itching.  She has been having recurrent yeast and has been taking OTC monistat that solves infection short-term but then returns.  She has been prescribed terconazole most recently as it worked better than fluconazole.    Advised terconazole can be prescribed. If symptoms do not resolve or stops and then comes back, should come to clinic for evaluation to discuss treatment and prevention. She indicated understanding and agreed with plan.

## 2020-06-19 DIAGNOSIS — E28.2 PCOS (POLYCYSTIC OVARIAN SYNDROME): ICD-10-CM

## 2020-06-19 RX ORDER — METFORMIN HCL 500 MG
1000 TABLET, EXTENDED RELEASE 24 HR ORAL
Qty: 180 TABLET | Refills: 1 | Status: SHIPPED | OUTPATIENT
Start: 2020-06-19 | End: 2021-01-05

## 2020-06-19 NOTE — TELEPHONE ENCOUNTER
Received refill request for metformin.  Last in clinic 11/2019 where this was to be continue. Rx sent.

## 2020-07-31 DIAGNOSIS — E28.2 PCOS (POLYCYSTIC OVARIAN SYNDROME): ICD-10-CM

## 2020-07-31 RX ORDER — DROSPIRENONE AND ETHINYL ESTRADIOL 0.03MG-3MG
1 KIT ORAL DAILY
Qty: 90 TABLET | Refills: 3 | Status: SHIPPED | OUTPATIENT
Start: 2020-07-31 | End: 2021-07-28

## 2020-07-31 NOTE — TELEPHONE ENCOUNTER
Received refill request for ocp.  Last in clinic 11/2019.      Refilled per COVID  Med refill guidelines for one year    Lab Results   Component Value Date    PAP NIL 02/02/2018

## 2020-12-02 DIAGNOSIS — E28.2 PCOS (POLYCYSTIC OVARIAN SYNDROME): ICD-10-CM

## 2020-12-02 RX ORDER — SPIRONOLACTONE 100 MG/1
100 TABLET, FILM COATED ORAL 2 TIMES DAILY
Qty: 180 TABLET | Refills: 3 | Status: SHIPPED | OUTPATIENT
Start: 2020-12-02 | End: 2021-12-30

## 2020-12-20 ENCOUNTER — HEALTH MAINTENANCE LETTER (OUTPATIENT)
Age: 25
End: 2020-12-20

## 2021-01-05 DIAGNOSIS — E28.2 PCOS (POLYCYSTIC OVARIAN SYNDROME): ICD-10-CM

## 2021-01-05 RX ORDER — METFORMIN HCL 500 MG
1000 TABLET, EXTENDED RELEASE 24 HR ORAL
Qty: 180 TABLET | Refills: 1 | Status: SHIPPED | OUTPATIENT
Start: 2021-01-05 | End: 2021-05-06

## 2021-04-18 ENCOUNTER — HEALTH MAINTENANCE LETTER (OUTPATIENT)
Age: 26
End: 2021-04-18

## 2021-05-04 DIAGNOSIS — E28.2 PCOS (POLYCYSTIC OVARIAN SYNDROME): ICD-10-CM

## 2021-05-04 NOTE — TELEPHONE ENCOUNTER
Received refill request for Metformin. Patient last seen by Orlin in 2019 for PCOS. Routing to her for approval.

## 2021-05-06 RX ORDER — METFORMIN HCL 500 MG
1000 TABLET, EXTENDED RELEASE 24 HR ORAL
Qty: 180 TABLET | Refills: 1 | Status: SHIPPED | OUTPATIENT
Start: 2021-05-06 | End: 2021-10-07

## 2021-07-28 DIAGNOSIS — E28.2 PCOS (POLYCYSTIC OVARIAN SYNDROME): ICD-10-CM

## 2021-07-28 RX ORDER — DROSPIRENONE AND ETHINYL ESTRADIOL 0.03MG-3MG
1 KIT ORAL DAILY
Qty: 90 TABLET | Refills: 3 | Status: SHIPPED | OUTPATIENT
Start: 2021-07-28 | End: 2022-02-16

## 2021-07-28 NOTE — TELEPHONE ENCOUNTER
Received refill request for OCP.  Last in clinic 2019. Pap in 2018 NIL. Able to fill per protocol.     Mychart appointment reminder sent.

## 2021-10-03 ENCOUNTER — HEALTH MAINTENANCE LETTER (OUTPATIENT)
Age: 26
End: 2021-10-03

## 2021-10-07 DIAGNOSIS — E28.2 PCOS (POLYCYSTIC OVARIAN SYNDROME): ICD-10-CM

## 2021-10-07 RX ORDER — METFORMIN HCL 500 MG
1000 TABLET, EXTENDED RELEASE 24 HR ORAL
Qty: 180 TABLET | Refills: 1 | Status: SHIPPED | OUTPATIENT
Start: 2021-10-07 | End: 2022-02-16

## 2021-12-28 ENCOUNTER — TELEPHONE (OUTPATIENT)
Dept: OBGYN | Facility: CLINIC | Age: 26
End: 2021-12-28
Payer: COMMERCIAL

## 2021-12-28 NOTE — TELEPHONE ENCOUNTER
Called and spoke with patient, states that for about 2 months she has been experiencing excessive hair loss and return of cystic acne. States is similar to how it was prior to starting spironolactone. Has annual exam scheduled with Dr. Ordaz which is the soonest available.     Advised I would send message to Dr. Ordaz to ask her to review and advise. Pt verbalized understanding and agreement.     Routed to Dr. Ordaz.

## 2021-12-28 NOTE — TELEPHONE ENCOUNTER
[facility] Call Center    Phone Message    May a detailed message be left on voicemail: yes     Reason for Call: Other: Allegra called stating that she would like to up her dosage for spironolactone (ALDACTONE) 100 MG tablet. She stated that she has been experiencing extreme hairloss and acne the past month or 2. Please call Allegra back to discuss, thank you.      Action Taken: Other: whs    Travel Screening: Not Applicable

## 2021-12-30 ENCOUNTER — TELEPHONE (OUTPATIENT)
Dept: OBGYN | Facility: CLINIC | Age: 26
End: 2021-12-30
Payer: COMMERCIAL

## 2021-12-30 DIAGNOSIS — E28.2 PCOS (POLYCYSTIC OVARIAN SYNDROME): ICD-10-CM

## 2021-12-30 RX ORDER — SPIRONOLACTONE 100 MG/1
100 TABLET, FILM COATED ORAL 2 TIMES DAILY
Qty: 60 TABLET | Refills: 0 | Status: SHIPPED | OUTPATIENT
Start: 2021-12-30 | End: 2022-01-05

## 2021-12-30 NOTE — TELEPHONE ENCOUNTER
Spoke with Allegra.  No response yet on dose adjustment, and advised that Dr Ordaz is out of office.  She requests one month fill at current dose.    Refilled.

## 2021-12-30 NOTE — TELEPHONE ENCOUNTER
----- Message from Fidelia Jara RN sent at 12/30/2021  7:55 AM CST -----  Regarding: Refill  Needs spironolactone refill today. Is also waiting for request for dose increase.

## 2022-01-05 DIAGNOSIS — E28.2 PCOS (POLYCYSTIC OVARIAN SYNDROME): Primary | ICD-10-CM

## 2022-01-05 RX ORDER — SPIRONOLACTONE 100 MG/1
200 TABLET, FILM COATED ORAL 2 TIMES DAILY
Qty: 120 TABLET | Refills: 4 | Status: SHIPPED | OUTPATIENT
Start: 2022-01-05 | End: 2022-02-16

## 2022-01-05 NOTE — TELEPHONE ENCOUNTER
Dose increased and new rx sent to pt's preferred pharmacy.     Called patient and left VM informing her that new prescription for spironolactone was sent to her preferred pharmacy, instructed her to call back with any questions or concerns.

## 2022-02-16 ENCOUNTER — OFFICE VISIT (OUTPATIENT)
Dept: OBGYN | Facility: CLINIC | Age: 27
End: 2022-02-16
Attending: OBSTETRICS & GYNECOLOGY

## 2022-02-16 VITALS
SYSTOLIC BLOOD PRESSURE: 105 MMHG | DIASTOLIC BLOOD PRESSURE: 60 MMHG | BODY MASS INDEX: 25.61 KG/M2 | HEIGHT: 64 IN | HEART RATE: 89 BPM | WEIGHT: 150 LBS

## 2022-02-16 DIAGNOSIS — Z12.4 SCREENING FOR MALIGNANT NEOPLASM OF CERVIX: Primary | ICD-10-CM

## 2022-02-16 DIAGNOSIS — L70.0 ACNE VULGARIS: ICD-10-CM

## 2022-02-16 DIAGNOSIS — E28.2 PCOS (POLYCYSTIC OVARIAN SYNDROME): ICD-10-CM

## 2022-02-16 PROCEDURE — G0463 HOSPITAL OUTPT CLINIC VISIT: HCPCS

## 2022-02-16 PROCEDURE — G0145 SCR C/V CYTO,THINLAYER,RESCR: HCPCS | Performed by: OBSTETRICS & GYNECOLOGY

## 2022-02-16 PROCEDURE — 87591 N.GONORRHOEAE DNA AMP PROB: CPT | Performed by: OBSTETRICS & GYNECOLOGY

## 2022-02-16 PROCEDURE — 99395 PREV VISIT EST AGE 18-39: CPT | Performed by: OBSTETRICS & GYNECOLOGY

## 2022-02-16 PROCEDURE — 87491 CHLMYD TRACH DNA AMP PROBE: CPT | Performed by: OBSTETRICS & GYNECOLOGY

## 2022-02-16 RX ORDER — OMEPRAZOLE 40 MG/1
40 CAPSULE, DELAYED RELEASE ORAL
COMMUNITY
Start: 2020-08-12

## 2022-02-16 RX ORDER — TRETINOIN 0.25 MG/G
CREAM TOPICAL AT BEDTIME
Qty: 45 G | Refills: 3 | Status: SHIPPED | OUTPATIENT
Start: 2022-02-16

## 2022-02-16 RX ORDER — SPIRONOLACTONE 100 MG/1
200 TABLET, FILM COATED ORAL 2 TIMES DAILY
Qty: 120 TABLET | Refills: 4 | Status: SHIPPED | OUTPATIENT
Start: 2022-02-16 | End: 2022-06-15

## 2022-02-16 RX ORDER — DROSPIRENONE AND ETHINYL ESTRADIOL 0.03MG-3MG
1 KIT ORAL DAILY
Qty: 90 TABLET | Refills: 3 | Status: SHIPPED | OUTPATIENT
Start: 2022-02-16 | End: 2023-03-09

## 2022-02-16 RX ORDER — METFORMIN HCL 500 MG
1000 TABLET, EXTENDED RELEASE 24 HR ORAL
Qty: 180 TABLET | Refills: 1 | Status: SHIPPED | OUTPATIENT
Start: 2022-02-16 | End: 2022-10-07

## 2022-02-16 RX ORDER — CLINDAMYCIN AND BENZOYL PEROXIDE 10; 50 MG/G; MG/G
GEL TOPICAL 2 TIMES DAILY
Qty: 60 G | Refills: 3 | Status: SHIPPED | OUTPATIENT
Start: 2022-02-16

## 2022-02-16 NOTE — LETTER
2/16/2022       RE: Brenda Morrissey  [address] [facility] [address]     Dear Colleague,    Thank you for referring your patient, Brenda Morrissey, to the [facility] [place] [facility]. Please see a copy of my visit note below.    CC/HPI:   Brenda Morrissey is a 26 year old P0 who presents today for her annual exam.   She is followed for PCOS managed on metformin, OCPs, and spironolactone.  She has been struggling with acne due to mask wearing.   HISTORIES:  Patient Active Problem List   Diagnosis     Menstrual irregularity     Hair loss     Acne     Breast atrophy     Joint pain     Thickened nails     Spider veins     Eating disorder     PCOS (polycystic ovarian syndrome)     Major depressive disorder, single episode, severe (H)     Past Medical History:   Diagnosis Date     Acne      Eating disorder, unspecified     Seen at the [facility]     PCOS (polycystic ovarian syndrome)      Past Surgical History:   Procedure Laterality Date     TONSILLECTOMY  2012     Current Outpatient Medications   Medication Sig Dispense Refill     clindamycin-benzoyl peroxide (BENZACLIN) 1-5 % external gel Apply topically 2 times daily Apply a thin layer on the face every morning 60 g 3     drospirenone-ethinyl estradiol (CAM) 3-0.03 MG tablet Take 1 tablet by mouth daily 90 tablet 3     metFORMIN (GLUCOPHAGE-XR) 500 MG 24 hr tablet Take 2 tablets (1,000 mg) by mouth daily (with dinner) 180 tablet 1     omeprazole (PRILOSEC) 40 MG DR capsule Take 40 mg by mouth       spironolactone (ALDACTONE) 100 MG tablet Take 2 tablets (200 mg) by mouth 2 times daily 120 tablet 4     tretinoin (RETIN-A) 0.025 % external cream Apply topically At Bedtime 45 g 3     conjugated estrogens (PREMARIN) cream Place 0.5 g vaginally daily 5 g 1     Multiple Vitamin (MULTIVITAMINS PO) Take  by mouth daily.       Tazarotene (TAZORAC EX)        terconazole  "(TERAZOL 7) 0.4 % vaginal cream Place 1 applicator vaginally At Bedtime 45 g 1     No Known Allergies  Social History     Socioeconomic History     Marital status: Single     Spouse name: Not on file     Number of children: Not on file     Years of education: Not on file     Highest education level: Not on file   Occupational History     Occupation: student   Tobacco Use     Smoking status: Never Smoker     Smokeless tobacco: Never Used   Substance and Sexual Activity     Alcohol use: No     Drug use: No     Sexual activity: Yes     Partners: Male     Birth control/protection: Inserts/Ring   Other Topics Concern     Not on file   Social History Narrative    2/16/22    Finished biology degree; working at bar full time, working for [employer]    Lisette Ordaz MD                7/31/15    [facility] in the Fall.     [employer], pre-nursing.     Lisette Ordaz MD                        2/18/15    Pre nursing at [employer]. \"on again off again\" boyfriend.     Lisette Brigida Ordaz            Lives with mom. Starting 12th grade (2013-14 school year). Has had difficulty in school due to fatigue.     Social Determinants of Health     Financial Resource Strain: Not on file   Food Insecurity: Not on file   Transportation Needs: Not on file   Physical Activity: Not on file   Stress: Not on file   Social Connections: Not on file   Intimate Partner Violence: Not on file   Housing Stability: Not on file     Family History   Problem Relation Age of Onset     Diabetes Paternal Grandfather         T2DM     Endocrine Disease Mother         late puberty and early menopause     Arthritis Father         hip replacement at age 45     Autoimmune Disease Paternal Aunt         lupus          Gyn Hx:   On OCP  Menses:   Regular on OCP      Review Of Systems:  CONSTITUTIONAL: NEGATIVE for fever, chills  EYES: NEGATIVE for vision changes   RESP: NEGATIVE for significant cough or SOB  CV: NEGATIVE for chest pain, palpitations   GI: " "NEGATIVE for nausea, abdominal pain, heartburn, or change in bowel habits  : NEGATIVE for frequency, dysuria, or hematuria  MUSCULOSKELETAL: NEGATIVE for significant arthralgias or myalgia  NEURO: NEGATIVE for weakness, dizziness or paresthesias or headache    EXAM:  /60   Pulse 89   Ht 1.626 m (5' 4\")   Wt 68 kg (150 lb)   BMI 25.75 kg/m    Body mass index is 25.75 kg/m .    General - pleasant female in no acute distress.  Skin - no suspicious lesions or rashes; cystic acne on chin and where mask touches her face  EENT-  PERRLA, euthyroid with out palpable nodules  Neck - supple without lymphadenopathy.  Lungs - clear to auscultation bilaterally.  Heart - regular rate and rhythm without murmur.  Breasts- symmetrical . No dominant fixed or suspicious masses noted.  No skin or nipple changes or axillary nodes. Self exam is taught and encouraged monthly.  Abdomen - soft, nontender, nondistended, no masses or organomegaly noted.  Musculoskeletal - no gross deformities.  Neurological - normal strength, sensation, and mental status.  Pelvic- EG wnl, vagina normal, cervix is small and nulliparous and pap is obtained  Bimanual - no masses and nontender, mobile small uterus    ASSESSMENT/PLAN  (Z12.4) Screening for malignant neoplasm of cervix  (primary encounter diagnosis)  Comment:   Plan: tretinoin (RETIN-A) 0.025 % external cream, Pap        screen reflex to HPV if ASCUS - recommend age         25 - 29, HPV Hold (Lab Only)            (E28.2) PCOS (polycystic ovarian syndrome)  Comment:   Plan: drospirenone-ethinyl estradiol (CAM) 3-0.03         MG tablet, spironolactone (ALDACTONE) 100 MG         tablet, metFORMIN (GLUCOPHAGE-XR) 500 MG 24 hr         tablet, tretinoin (RETIN-A) 0.025 % external         cream, Chlamydia trachomatis/Neisseria         gonorrhoeae by PCR, CANCELED: Chlamydia         trachomatis/Neisseria gonorrhoeae by PCR            (L70.0) Acne vulgaris  Comment:   Plan: clindamycin-benzoyl " peroxide (BENZACLIN) 1-5 %         external gel, tretinoin (RETIN-A) 0.025 %         external cream              Lisette Ordaz MD

## 2022-02-17 LAB
C TRACH DNA SPEC QL PROBE+SIG AMP: NEGATIVE
N GONORRHOEA DNA SPEC QL NAA+PROBE: NEGATIVE

## 2022-02-18 LAB
BKR LAB AP GYN ADEQUACY: NORMAL
BKR LAB AP GYN INTERPRETATION: NORMAL
BKR LAB AP HPV REFLEX: NORMAL
BKR LAB AP PREVIOUS ABNORMAL: NORMAL
PATH REPORT.COMMENTS IMP SPEC: NORMAL
PATH REPORT.COMMENTS IMP SPEC: NORMAL
PATH REPORT.RELEVANT HX SPEC: NORMAL

## 2022-02-18 NOTE — PROGRESS NOTES
CC/HPI:   Brenda Morrissey is a 26 year old P0 who presents today for her annual exam.   She is followed for PCOS managed on metformin, OCPs, and spironolactone.  She has been struggling with acne due to mask wearing.   HISTORIES:  Patient Active Problem List   Diagnosis     Menstrual irregularity     Hair loss     Acne     Breast atrophy     Joint pain     Thickened nails     Spider veins     Eating disorder     PCOS (polycystic ovarian syndrome)     Major depressive disorder, single episode, severe (H)     Past Medical History:   Diagnosis Date     Acne      Eating disorder, unspecified     Seen at the [facility]     PCOS (polycystic ovarian syndrome)      Past Surgical History:   Procedure Laterality Date     TONSILLECTOMY  2012     Current Outpatient Medications   Medication Sig Dispense Refill     clindamycin-benzoyl peroxide (BENZACLIN) 1-5 % external gel Apply topically 2 times daily Apply a thin layer on the face every morning 60 g 3     drospirenone-ethinyl estradiol (CAM) 3-0.03 MG tablet Take 1 tablet by mouth daily 90 tablet 3     metFORMIN (GLUCOPHAGE-XR) 500 MG 24 hr tablet Take 2 tablets (1,000 mg) by mouth daily (with dinner) 180 tablet 1     omeprazole (PRILOSEC) 40 MG DR capsule Take 40 mg by mouth       spironolactone (ALDACTONE) 100 MG tablet Take 2 tablets (200 mg) by mouth 2 times daily 120 tablet 4     tretinoin (RETIN-A) 0.025 % external cream Apply topically At Bedtime 45 g 3     conjugated estrogens (PREMARIN) cream Place 0.5 g vaginally daily 5 g 1     Multiple Vitamin (MULTIVITAMINS PO) Take  by mouth daily.       Tazarotene (TAZORAC EX)        terconazole (TERAZOL 7) 0.4 % vaginal cream Place 1 applicator vaginally At Bedtime 45 g 1     No Known Allergies  Social History     Socioeconomic History     Marital status: Single     Spouse name: Not on file     Number of children: Not on file     Years of education: Not on file     Highest education level: Not on file  "  Occupational History     Occupation: student   Tobacco Use     Smoking status: Never Smoker     Smokeless tobacco: Never Used   Substance and Sexual Activity     Alcohol use: No     Drug use: No     Sexual activity: Yes     Partners: Male     Birth control/protection: Inserts/Ring   Other Topics Concern     Not on file   Social History Narrative    2/16/22    Finished biology degree; working at bar full time, working for [employer]    Lisette Ordaz MD                7/31/15    [facility] in the Fall.     [employer], pre-nursing.     Lisette Ordaz MD                        2/18/15    Pre nursing at [employer]. \"on again off again\" boyfriend.     Lisette Ann Orlin            Lives with mom. Starting 12th grade (2013-14 school year). Has had difficulty in school due to fatigue.     Social Determinants of Health     Financial Resource Strain: Not on file   Food Insecurity: Not on file   Transportation Needs: Not on file   Physical Activity: Not on file   Stress: Not on file   Social Connections: Not on file   Intimate Partner Violence: Not on file   Housing Stability: Not on file     Family History   Problem Relation Age of Onset     Diabetes Paternal Grandfather         T2DM     Endocrine Disease Mother         late puberty and early menopause     Arthritis Father         hip replacement at age 45     Autoimmune Disease Paternal Aunt         lupus          Gyn Hx:   On OCP  Menses:   Regular on OCP      Review Of Systems:  CONSTITUTIONAL: NEGATIVE for fever, chills  EYES: NEGATIVE for vision changes   RESP: NEGATIVE for significant cough or SOB  CV: NEGATIVE for chest pain, palpitations   GI: NEGATIVE for nausea, abdominal pain, heartburn, or change in bowel habits  : NEGATIVE for frequency, dysuria, or hematuria  MUSCULOSKELETAL: NEGATIVE for significant arthralgias or myalgia  NEURO: NEGATIVE for weakness, dizziness or paresthesias or headache    EXAM:  /60   Pulse 89   Ht 1.626 m (5' 4\")  "  Wt 68 kg (150 lb)   BMI 25.75 kg/m    Body mass index is 25.75 kg/m .    General - pleasant female in no acute distress.  Skin - no suspicious lesions or rashes; cystic acne on chin and where mask touches her face  EENT-  PERRLA, euthyroid with out palpable nodules  Neck - supple without lymphadenopathy.  Lungs - clear to auscultation bilaterally.  Heart - regular rate and rhythm without murmur.  Breasts- symmetrical . No dominant fixed or suspicious masses noted.  No skin or nipple changes or axillary nodes. Self exam is taught and encouraged monthly.  Abdomen - soft, nontender, nondistended, no masses or organomegaly noted.  Musculoskeletal - no gross deformities.  Neurological - normal strength, sensation, and mental status.  Pelvic- EG wnl, vagina normal, cervix is small and nulliparous and pap is obtained  Bimanual - no masses and nontender, mobile small uterus    ASSESSMENT/PLAN  (Z12.4) Screening for malignant neoplasm of cervix  (primary encounter diagnosis)  Comment:   Plan: tretinoin (RETIN-A) 0.025 % external cream, Pap        screen reflex to HPV if ASCUS - recommend age         25 - 29, HPV Hold (Lab Only)            (E28.2) PCOS (polycystic ovarian syndrome)  Comment:   Plan: drospirenone-ethinyl estradiol (CAM) 3-0.03         MG tablet, spironolactone (ALDACTONE) 100 MG         tablet, metFORMIN (GLUCOPHAGE-XR) 500 MG 24 hr         tablet, tretinoin (RETIN-A) 0.025 % external         cream, Chlamydia trachomatis/Neisseria         gonorrhoeae by PCR, CANCELED: Chlamydia         trachomatis/Neisseria gonorrhoeae by PCR            (L70.0) Acne vulgaris  Comment:   Plan: clindamycin-benzoyl peroxide (BENZACLIN) 1-5 %         external gel, tretinoin (RETIN-A) 0.025 %         external cream              Lisette Ordaz MD

## 2022-06-15 DIAGNOSIS — E28.2 PCOS (POLYCYSTIC OVARIAN SYNDROME): ICD-10-CM

## 2022-06-15 RX ORDER — SPIRONOLACTONE 100 MG/1
200 TABLET, FILM COATED ORAL 2 TIMES DAILY
Qty: 120 TABLET | Refills: 4 | Status: SHIPPED | OUTPATIENT
Start: 2022-06-15 | End: 2022-12-07

## 2022-09-04 ENCOUNTER — HEALTH MAINTENANCE LETTER (OUTPATIENT)
Age: 27
End: 2022-09-04

## 2022-10-06 ENCOUNTER — TELEPHONE (OUTPATIENT)
Dept: OBGYN | Facility: CLINIC | Age: 27
End: 2022-10-06

## 2022-10-07 ENCOUNTER — TELEPHONE (OUTPATIENT)
Dept: OBGYN | Facility: CLINIC | Age: 27
End: 2022-10-07

## 2022-10-07 DIAGNOSIS — E28.2 PCOS (POLYCYSTIC OVARIAN SYNDROME): ICD-10-CM

## 2022-10-07 RX ORDER — METFORMIN HCL 500 MG
1000 TABLET, EXTENDED RELEASE 24 HR ORAL
Qty: 180 TABLET | Refills: 1 | Status: SHIPPED | OUTPATIENT
Start: 2022-10-07 | End: 2023-04-06

## 2022-12-07 DIAGNOSIS — E28.2 PCOS (POLYCYSTIC OVARIAN SYNDROME): ICD-10-CM

## 2022-12-07 RX ORDER — SPIRONOLACTONE 100 MG/1
200 TABLET, FILM COATED ORAL 2 TIMES DAILY
Qty: 120 TABLET | Refills: 4 | Status: SHIPPED | OUTPATIENT
Start: 2022-12-07 | End: 2023-10-16

## 2023-03-09 DIAGNOSIS — E28.2 PCOS (POLYCYSTIC OVARIAN SYNDROME): ICD-10-CM

## 2023-03-09 RX ORDER — DROSPIRENONE AND ETHINYL ESTRADIOL 0.03MG-3MG
1 KIT ORAL DAILY
Qty: 90 TABLET | Refills: 3 | Status: SHIPPED | OUTPATIENT
Start: 2023-03-09

## 2023-03-09 NOTE — TELEPHONE ENCOUNTER
Received a refill request for patients OCP. Last PAP on 2-16-22(negative) and that was the last office visit as well.    Refilled per protocol.    Patient notified via [employer] of the refill sent.

## 2023-04-05 DIAGNOSIS — E28.2 PCOS (POLYCYSTIC OVARIAN SYNDROME): ICD-10-CM

## 2023-04-05 NOTE — TELEPHONE ENCOUNTER
Patient calling requesting this refill. Patient would like Dr. Ordaz to be aware that she does not currently have health insurance so she would like to get a refill without needing to have an annual visit.    Routing to Dr. Ordaz for review.

## 2023-04-06 RX ORDER — METFORMIN HCL 500 MG
1000 TABLET, EXTENDED RELEASE 24 HR ORAL
Qty: 180 TABLET | Refills: 1 | Status: SHIPPED | OUTPATIENT
Start: 2023-04-06

## 2023-04-30 ENCOUNTER — HEALTH MAINTENANCE LETTER (OUTPATIENT)
Age: 28
End: 2023-04-30

## 2023-10-16 DIAGNOSIS — E28.2 PCOS (POLYCYSTIC OVARIAN SYNDROME): ICD-10-CM

## 2023-10-16 NOTE — TELEPHONE ENCOUNTER
Spironolactone prescription pended to Dr. Ordaz per clinic protocol. Patient last seen in clinic on 2/16/2022 and does not have any currently scheduled upcoming appointments.

## 2023-10-17 RX ORDER — SPIRONOLACTONE 100 MG/1
200 TABLET, FILM COATED ORAL 2 TIMES DAILY
Qty: 120 TABLET | Refills: 4 | Status: SHIPPED | OUTPATIENT
Start: 2023-10-17

## 2024-07-07 ENCOUNTER — HEALTH MAINTENANCE LETTER (OUTPATIENT)
Age: 29
End: 2024-07-07